# Patient Record
(demographics unavailable — no encounter records)

---

## 2017-07-11 NOTE — RAD
CHEST TWO VIEWS:

 

History: Cough. Long term cigarette abuse. 

 

Comparison: None. 

 

FINDINGS: 

Two views chest. Normal cardiac silhouette. The pulmonary vessels and hilum are normal. No masses or
 consolidation. No pneumothorax or osseous abnormalities. 

 

IMPRESSION: 

No acute cardiopulmonary process. 

 

POS: SOPHIA